# Patient Record
Sex: FEMALE | Race: WHITE | NOT HISPANIC OR LATINO | URBAN - METROPOLITAN AREA
[De-identification: names, ages, dates, MRNs, and addresses within clinical notes are randomized per-mention and may not be internally consistent; named-entity substitution may affect disease eponyms.]

---

## 2017-03-15 ENCOUNTER — OUTPATIENT (OUTPATIENT)
Dept: OUTPATIENT SERVICES | Facility: HOSPITAL | Age: 27
LOS: 1 days | Discharge: HOME | End: 2017-03-15

## 2017-03-15 ENCOUNTER — RX RENEWAL (OUTPATIENT)
Age: 27
End: 2017-03-15

## 2017-03-27 ENCOUNTER — OUTPATIENT (OUTPATIENT)
Dept: OUTPATIENT SERVICES | Facility: HOSPITAL | Age: 27
LOS: 1 days | Discharge: HOME | End: 2017-03-27

## 2017-06-27 DIAGNOSIS — K03.81 CRACKED TOOTH: ICD-10-CM

## 2017-08-01 ENCOUNTER — OUTPATIENT (OUTPATIENT)
Dept: OUTPATIENT SERVICES | Facility: HOSPITAL | Age: 27
LOS: 1 days | Discharge: HOME | End: 2017-08-01

## 2017-08-01 DIAGNOSIS — Z01.21 ENCOUNTER FOR DENTAL EXAMINATION AND CLEANING WITH ABNORMAL FINDINGS: ICD-10-CM

## 2017-08-09 ENCOUNTER — OUTPATIENT (OUTPATIENT)
Dept: OUTPATIENT SERVICES | Facility: HOSPITAL | Age: 27
LOS: 1 days | Discharge: HOME | End: 2017-08-09

## 2017-08-09 DIAGNOSIS — K02.52 DENTAL CARIES ON PIT AND FISSURE SURFACE PENETRATING INTO DENTIN: ICD-10-CM

## 2017-09-22 ENCOUNTER — RX RENEWAL (OUTPATIENT)
Age: 27
End: 2017-09-22

## 2018-02-02 DIAGNOSIS — K03.81 CRACKED TOOTH: ICD-10-CM

## 2018-03-28 ENCOUNTER — RX RENEWAL (OUTPATIENT)
Age: 28
End: 2018-03-28

## 2018-09-28 ENCOUNTER — RX RENEWAL (OUTPATIENT)
Age: 28
End: 2018-09-28

## 2018-12-25 ENCOUNTER — EMERGENCY (EMERGENCY)
Facility: HOSPITAL | Age: 28
LOS: 0 days | Discharge: HOME | End: 2018-12-25
Attending: EMERGENCY MEDICINE | Admitting: EMERGENCY MEDICINE

## 2018-12-25 VITALS
SYSTOLIC BLOOD PRESSURE: 138 MMHG | DIASTOLIC BLOOD PRESSURE: 72 MMHG | RESPIRATION RATE: 18 BRPM | TEMPERATURE: 99 F | OXYGEN SATURATION: 100 % | HEART RATE: 71 BPM

## 2018-12-25 VITALS — WEIGHT: 106.04 LBS
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
## 2018-12-25 DIAGNOSIS — S01.511A LACERATION WITHOUT FOREIGN BODY OF LIP, INITIAL ENCOUNTER: ICD-10-CM

## 2018-12-25 DIAGNOSIS — Z79.899 OTHER LONG TERM (CURRENT) DRUG THERAPY: ICD-10-CM
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
## 2018-12-25 DIAGNOSIS — Z91.013 ALLERGY TO SEAFOOD: ICD-10-CM

## 2018-12-25 DIAGNOSIS — Z87.19 PERSONAL HISTORY OF OTHER DISEASES OF THE DIGESTIVE SYSTEM: Chronic | ICD-10-CM
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
## 2018-12-25 DIAGNOSIS — Z98.890 OTHER SPECIFIED POSTPROCEDURAL STATES: ICD-10-CM
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
## 2018-12-25 RX ORDER — AMOXICILLIN 250 MG/5ML
1 SUSPENSION, RECONSTITUTED, ORAL (ML) ORAL
Qty: 21 | Refills: 0 | OUTPATIENT
Start: 2018-12-25 | End: 2018-12-31

## 2018-12-25 RX ORDER — NADOLOL 80 MG/1
0 TABLET ORAL
Qty: 0 | Refills: 0 | COMMUNITY

## 2018-12-25 RX ORDER — AMOXICILLIN 250 MG/5ML
500 SUSPENSION, RECONSTITUTED, ORAL (ML) ORAL ONCE
Qty: 0 | Refills: 0 | Status: COMPLETED | OUTPATIENT
Start: 2018-12-25 | End: 2018-12-25

## 2018-12-25 RX ADMIN — Medication 500 MILLIGRAM(S): at 18:13
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
## 2018-12-25 NOTE — ED PROVIDER NOTE - NSFOLLOWUPINSTRUCTIONS_ED_ALL_ED_FT
Laceration    A laceration is a cut that goes through all of the layers of the skin and into the tissue that is right under the skin. Some lacerations heal on their own. Others need to be closed with stitches (sutures), staples, skin adhesive strips, or skin glue. Proper laceration care minimizes the risk of infection and helps the laceration to heal better.     SEEK IMMEDIATE MEDICAL CARE IF YOU HAVE THE FOLLOWING SYMPTOMS: swelling around the wound, worsening pain, drainage from the wound, red streaking going away from your wound, inability to move finger or toe near the laceration, or discoloration of skin near the laceration.

## 2018-12-25 NOTE — ED ADULT NURSE NOTE - NSIMPLEMENTINTERV_GEN_ALL_ED
Implemented All Fall with Harm Risk Interventions:  [place] to call system. Call bell, personal items and telephone within reach. Instruct patient to call for assistance. Room bathroom lighting operational. Non-slip footwear when patient is off stretcher. Physically safe environment: no spills, clutter or unnecessary equipment. Stretcher in lowest position, wheels locked, appropriate side rails in place. Provide visual cue, wrist band, yellow gown, etc. Monitor gait and stability. Monitor for mental status changes and reorient to person, place, and time. Review medications for side effects contributing to fall risk. Reinforce activity limits and safety measures with patient and family. Provide visual clues: red socks.

## 2018-12-25 NOTE — ED PROVIDER NOTE - PROGRESS NOTE DETAILS
+FB noted to lower lip on xray. Wound explored under bloodless field, irrigated with normal saline. No FB noted. Per mom patient had prior laceration to lower lip involving dental fx requiring cap. ? retained FB from previous injury. Mom advised to look for signs of infection including, pus, drainage, fevers, chills, swelling and return to ED for any concerns. Abx prescribed

## 2018-12-25 NOTE — ED PROCEDURE NOTE - PROCEDURE ADDITIONAL DETAILS
2 lacs 0.5 cm each and 1 lac on lip 0.5cm. Wound explored under bloodless field. No FB noted. Total of 3 suture placed, 1 on each lac

## 2018-12-25 NOTE — ED PROVIDER NOTE - PHYSICAL EXAMINATION
Gen: Alert, NAD, well appearing  Head: NC, AT, PERRL, EOMI, normal lids/conjunctiva  ENT: normal hearing, patent oropharynx. +ridges noted teeth (chronic per mom, patient grinds teeth). No acute dental injury noted. +0.5cm lac to lower lip, through and through, 0.5cm lac x2 inside lower lip of mouth  Neck: +supple, no tenderness/meningismus,  Mskel: no edema/erythema/cyanosis  Skin: +contusion to chin. No rash, warm/dry. + 0.5cm  lac to lip and 2 lacs inside mouth 0.5cm  each  Neuro: Alert, at baseline per mom.

## 2018-12-25 NOTE — ED PROVIDER NOTE - NS ED ROS FT
Review of Systems    Constitutional: (-) fever  Eyes/ENT: (-) blurry vision, (-) epistaxis, (+) lac inside mouth  Gastrointestinal: (-) vomiting, (-) diarrhea  Musculoskeletal: (-) neck pain, (-) back pain, (-) joint pain  Integumentary: (-) rash, (-) edema, (+) lac to lip  Neurological: (-) headache, (-) altered mental status

## 2019-04-15 ENCOUNTER — MEDICATION RENEWAL (OUTPATIENT)
Age: 29
End: 2019-04-15

## 2019-07-01 ENCOUNTER — OUTPATIENT (OUTPATIENT)
Dept: OUTPATIENT SERVICES | Facility: HOSPITAL | Age: 29
LOS: 1 days | Discharge: HOME | End: 2019-07-01

## 2019-07-01 DIAGNOSIS — Z87.19 PERSONAL HISTORY OF OTHER DISEASES OF THE DIGESTIVE SYSTEM: Chronic | ICD-10-CM

## 2019-07-01 DIAGNOSIS — Z01.20 ENCOUNTER FOR DENTAL EXAMINATION AND CLEANING WITHOUT ABNORMAL FINDINGS: ICD-10-CM

## 2019-07-01 PROBLEM — F84.2 RETT'S SYNDROME: Chronic | Status: ACTIVE | Noted: 2018-12-25

## 2019-07-01 PROBLEM — I45.81 LONG QT SYNDROME: Chronic | Status: ACTIVE | Noted: 2018-12-25

## 2019-09-12 ENCOUNTER — EMERGENCY (EMERGENCY)
Facility: HOSPITAL | Age: 29
LOS: 0 days | Discharge: HOME | End: 2019-09-12
Attending: EMERGENCY MEDICINE | Admitting: EMERGENCY MEDICINE
Payer: COMMERCIAL

## 2019-09-12 VITALS
HEART RATE: 68 BPM | OXYGEN SATURATION: 98 % | DIASTOLIC BLOOD PRESSURE: 68 MMHG | TEMPERATURE: 97 F | RESPIRATION RATE: 20 BRPM | SYSTOLIC BLOOD PRESSURE: 101 MMHG

## 2019-09-12 DIAGNOSIS — S02.5XXA FRACTURE OF TOOTH (TRAUMATIC), INITIAL ENCOUNTER FOR CLOSED FRACTURE: ICD-10-CM

## 2019-09-12 DIAGNOSIS — S01.81XA LACERATION WITHOUT FOREIGN BODY OF OTHER PART OF HEAD, INITIAL ENCOUNTER: ICD-10-CM
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
## 2019-09-12 DIAGNOSIS — Z87.19 PERSONAL HISTORY OF OTHER DISEASES OF THE DIGESTIVE SYSTEM: Chronic | ICD-10-CM

## 2019-09-12 DIAGNOSIS — S01.511A LACERATION WITHOUT FOREIGN BODY OF LIP, INITIAL ENCOUNTER: ICD-10-CM

## 2019-09-12 DIAGNOSIS — Z91.013 ALLERGY TO SEAFOOD: ICD-10-CM
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
## 2019-09-12 DIAGNOSIS — S09.90XA UNSPECIFIED INJURY OF HEAD, INITIAL ENCOUNTER: ICD-10-CM
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
## 2019-09-12 PROCEDURE — 99284 EMERGENCY DEPT VISIT MOD MDM: CPT | Mod: 25

## 2019-09-12 PROCEDURE — 70450 CT HEAD/BRAIN W/O DYE: CPT | Mod: 26

## 2019-09-12 PROCEDURE — 12013 RPR F/E/E/N/L/M 2.6-5.0 CM: CPT

## 2019-09-12 PROCEDURE — 70486 CT MAXILLOFACIAL W/O DYE: CPT | Mod: 26

## 2019-09-12 NOTE — CONSULT NOTE ADULT - SUBJECTIVE AND OBJECTIVE BOX
Patient is a 28y old  Female who presents with a chief complaint of fell and cracked teeth #8 & #9    HPI: Patient fell on the chair and hit her face and cracked tooth #8 & #9      PAST MEDICAL & SURGICAL HISTORY:  Rett syndrome  Prolonged QT syndrome  H/O inguinal hernia    (  - ) heart valve replacement  ( -  ) joint replacement  ( -  ) pregnancy    MEDICATIONS  (STANDING):    MEDICATIONS  (PRN):      Allergies    No Known Drug Allergies  shellfish (Unknown)    Intolerances        FAMILY HISTORY:      *SOCIAL HISTORY: ( -  ) Tobacco; (  - ) ETOH    *Last Dental Visit:    Vital Signs Last 24 Hrs  T(C): 36.1 (12 Sep 2019 12:23), Max: 36.1 (12 Sep 2019 12:23)  T(F): 97 (12 Sep 2019 12:23), Max: 97 (12 Sep 2019 12:23)  HR: 68 (12 Sep 2019 12:23) (68 - 68)  BP: 101/68 (12 Sep 2019 12:23) (101/68 - 101/68)  BP(mean): --  RR: 20 (12 Sep 2019 12:23) (20 - 20)  SpO2: 98% (12 Sep 2019 12:23) (98% - 98%)    EOE:  TMJ ( -  ) clicks                     ( -  ) pops                     ( -  ) crepitus             Mandible <<FROM>>             Facial bones and MOM <<grossly intact>>             (  - ) trismus             ( -  ) lymphadenopathy             ( +  ) swelling             ( -  ) asymmetry             ( +  ) palpation             (  - ) dyspnea             ( -  ) dysphagia             ( -  ) loss of consciousness    IOE:  <<permanent>> dentition: <<grossly intact>> OR <<multiple carious teeth>> OR <<multiple missing teeth>>           hard/soft palate:  ( -  ) palatal torus, <<No pathology noted>>           tongue/FOM <<No pathology noted>>           labial/buccal mucosa <<No pathology noted>>           ( +  ) percussion           ( +  ) palpation           ( +  ) swelling            ( -  ) abscess           ( -  ) sinus tract    Dentition present: <<   >>  Mobility: << Class II mobility on tooth #8 and #9  >>  Caries: <<   >>         *DENTAL RADIOGRAPHS: Periapical X-ray taken. #8 & #9 cracked into dentin without pulp involved    RADIOLOGY & ADDITIONAL STUDIES:    *ASSESSMENT:    EOE: Lip laceration with stitches from the ED    IOE: tooth #8 & #9 cracked with out pulp exposed.      *PLAN:    Smooth out the sharp edges and build tooth #8 & 9 up with  and composite    Informed mother that the tooth #8 & 9 have questionable restorability. Mother understands    Next Visit: Re-evaluate the vitality for tooth #8 & 9 and restore #8 & #9 with composite.    PROCEDURE:   Verbal and written consent given.  Anesthesia: <<  none  >>   Treatment: << smooth out the sharp edges using white polish bur, apply L-pop, and build #8 up with composite. Polish #8 using polish bur. Occlusion checked. Tooth #9 covered with Vitre bond and will be re-evaluate again next visit  >>     RECOMMENDATIONS:  1) << soft diet, antibiotic, and pain control >>  2) Dental F/U for permanent filling on tooth #8 and #9  3) If any difficulty swallowing/breathing, fever occur, return to ER.     Next Visit: Re-evaluate the vitality for tooth #8 & 9 and restore #8 & #9 with composite.    Dr. Iogr Guillen DDS, pager # [phone]

## 2019-09-12 NOTE — ED PROVIDER NOTE - PATIENT PORTAL LINK FT
You can access the FollowMyHealth Patient Portal offered by [facility] by registering at the following website: [email or web address]. By joining [employer]’s FollowMyHealth portal, you will also be able to view your health information using other applications (apps) compatible with our system. You can access the FollowMyHealth Patient Portal offered by [facility] by registering at the following website: [email or web address]. By joining [employer]’s FollowMyHealth portal, you will also be able to view your health information using other applications (apps) compatible with our system.

## 2019-09-12 NOTE — ED PROVIDER NOTE - CLINICAL SUMMARY MEDICAL DECISION MAKING FREE TEXT BOX
28 female with known baseline cognitive issues here for head injury with laceration; had imaging wound management and plan is direct discharge to Dental clinic.

## 2019-09-12 NOTE — ED PROVIDER NOTE - PHYSICAL EXAMINATION
VITAL SIGNS: I have reviewed nursing notes and confirm.  CONSTITUTIONAL: Well-developed; well-nourished; in no acute distress.  SKIN: +2cm laceration to top of upper lip. +1.5cm laceration to inner upper lip. Remainder of skin exam is warm and dry, no acute rash.  HEAD: Normocephalic; atraumatic.  EYES: PERRL, EOM intact; conjunctiva and sclera clear.  ENT: No nasal discharge; airway clear. See above. No epistaxis.   NECK: Supple; non tender.  CARD: S1, S2 normal; no murmurs, gallops, or rubs. Regular rate and rhythm.  RESP: Clear to auscultation bilaterally. No wheezes, rales or rhonchi.  ABD: Normal bowel sounds; soft; non-distended; non-tender.   EXT: Normal ROM. No edema.  LYMPH: No acute cervical adenopathy.  NEURO: Alert, oriented. Grossly unremarkable. No focal deficits.  PSYCH: Cooperative, appropriate. VITAL SIGNS: I have reviewed nursing notes and confirm.  CONSTITUTIONAL: Well-developed; well-nourished; in no acute distress.  SKIN: +2cm laceration to top of upper lip. +1.5cm laceration to inner upper lip. +0.5 cm laceration above lateral aspect of upper lip. Remainder of skin exam is warm and dry, no acute rash.  HEAD: Normocephalic; atraumatic.  EYES: PERRL, EOM intact; conjunctiva and sclera clear.  ENT: No nasal discharge; airway clear. See above. No epistaxis.   NECK: Supple; non tender.  CARD: S1, S2 normal; no murmurs, gallops, or rubs. Regular rate and rhythm.  RESP: Clear to auscultation bilaterally. No wheezes, rales or rhonchi.  ABD: Normal bowel sounds; soft; non-distended; non-tender.   EXT: Normal ROM. No edema.  LYMPH: No acute cervical adenopathy.  NEURO: Alert, oriented. Grossly unremarkable. No focal deficits.  PSYCH: Cooperative, appropriate.

## 2019-09-12 NOTE — ED PROVIDER NOTE - NS ED ROS FT
Review of Systems:  	•	CONSTITUTIONAL - no fever, no diaphoresis, no chills  	•	SKIN - +laceration, no rash  	•	HEMATOLOGIC - + bleeding, no bruising  	•	EYES - no eye pain, no blurry vision  	•	ENT - +tooth fracture, no congestion  	•	RESPIRATORY - no shortness of breath, no cough  	•	CARDIAC - no chest pain, no palpitations  	•	GI - no abd pain, no nausea, no vomiting, no diarrhea, no constipation  	•	GENITO-URINARY - no dysuria; no hematuria, no increased urinary frequency  	•	MUSCULOSKELETAL - no joint paint, no swelling, no redness  	•	NEUROLOGIC - +head injury, no weakness, no headache, no paresthesias, no LOC  	All other ROS are negative except as documented in HPI.
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
## 2019-09-12 NOTE — ED PROVIDER NOTE - CARE PLAN
Principal Discharge DX:	Lip laceration  Secondary Diagnosis:	Head injury Principal Discharge DX:	Lip laceration  Secondary Diagnosis:	Head injury  Secondary Diagnosis:	Facial laceration  Secondary Diagnosis:	Fall  Secondary Diagnosis:	Tooth fractures
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
## 2019-09-12 NOTE — ED ADULT NURSE NOTE - OBJECTIVE STATEMENT
pt fell PTA, GCS=15. SNP, nonverbal. baseline mental status per mother. laceration to upper lip. mother denies LOC. no vomiting per mother

## 2019-09-12 NOTE — ED PROVIDER NOTE - NSFOLLOWUPINSTRUCTIONS_ED_ALL_ED_FT
Laceration    A laceration is a cut that goes through all of the layers of the skin and into the tissue that is right under the skin. Some lacerations heal on their own. Others need to be closed with stitches (sutures), staples, skin adhesive strips, or skin glue. Proper laceration care minimizes the risk of infection and helps the laceration to heal better.     SEEK IMMEDIATE MEDICAL CARE IF YOU HAVE THE FOLLOWING SYMPTOMS: swelling around the wound, worsening pain, drainage from the wound, red streaking going away from your wound, inability to move finger or toe near the laceration, or discoloration of skin near the laceration.     Closed Head Injury    Closed head injury in an injury to your head that may or may not involve a traumatic brain injury (TBI). Symptoms of TBI can be short or long lasting and include headache, dizziness, interference with memory or speech, fatigue, confusion, changes in sleep, mood changes, nausea, depression/anxiety, and dulling of senses. Make sure to obtain proper rest which includes getting plenty of sleep, avoiding excessive visual stimulation, and avoiding activities that may cause physical or mental stress. Avoid any situation where there is potential for another head injury including sports.    SEEK MEDICAL CARE IF YOU HAVE THE FOLLOWING SYMPTOMS: unusual drowsiness, vomiting, severe dizziness, seizures, lightheadedness, muscular weakness, different pupil sizes, visual changes, or clear or bloody discharge from your ears or nose. Return in ED or PMD in 5 days for suture removal.     Laceration    A laceration is a cut that goes through all of the layers of the skin and into the tissue that is right under the skin. Some lacerations heal on their own. Others need to be closed with stitches (sutures), staples, skin adhesive strips, or skin glue. Proper laceration care minimizes the risk of infection and helps the laceration to heal better.     SEEK IMMEDIATE MEDICAL CARE IF YOU HAVE THE FOLLOWING SYMPTOMS: swelling around the wound, worsening pain, drainage from the wound, red streaking going away from your wound, inability to move finger or toe near the laceration, or discoloration of skin near the laceration.     Closed Head Injury    Closed head injury in an injury to your head that may or may not involve a traumatic brain injury (TBI). Symptoms of TBI can be short or long lasting and include headache, dizziness, interference with memory or speech, fatigue, confusion, changes in sleep, mood changes, nausea, depression/anxiety, and dulling of senses. Make sure to obtain proper rest which includes getting plenty of sleep, avoiding excessive visual stimulation, and avoiding activities that may cause physical or mental stress. Avoid any situation where there is potential for another head injury including sports.    SEEK MEDICAL CARE IF YOU HAVE THE FOLLOWING SYMPTOMS: unusual drowsiness, vomiting, severe dizziness, seizures, lightheadedness, muscular weakness, different pupil sizes, visual changes, or clear or bloody discharge from your ears or nose. Return in ED or PMD in 5 days for suture removal.     Laceration    A laceration is a cut that goes through all of the layers of the skin and into the tissue that is right under the skin. Some lacerations heal on their own. Others need to be closed with stitches (sutures), staples, skin adhesive strips, or skin glue. Proper laceration care minimizes the risk of infection and helps the laceration to heal better.     SEEK IMMEDIATE MEDICAL CARE IF YOU HAVE THE FOLLOWING SYMPTOMS: swelling around the wound, worsening pain, drainage from the wound, red streaking going away from your wound, inability to move finger or toe near the laceration, or discoloration of skin near the laceration.     Closed Head Injury    Closed head injury in an injury to your head that may or may not involve a traumatic brain injury (TBI). Symptoms of TBI can be short or long lasting and include headache, dizziness, interference with memory or speech, fatigue, confusion, changes in sleep, mood changes, nausea, depression/anxiety, and dulling of senses. Make sure to obtain proper rest which includes getting plenty of sleep, avoiding excessive visual stimulation, and avoiding activities that may cause physical or mental stress. Avoid any situation where there is potential for another head injury including sports.    SEEK MEDICAL CARE IF YOU HAVE THE FOLLOWING SYMPTOMS: unusual drowsiness, vomiting, severe dizziness, seizures, lightheadedness, muscular weakness, different pupil sizes, visual changes, or clear or bloody discharge from your ears or nose.    Facial Laceration  A facial laceration is a cut (laceration) on the face. You can get a facial laceration from any accident or injury that cuts or tears the skin or tissues on your face. Facial lacerations can bleed and be painful. You may need medical attention to stop the bleeding, help the wound heal, lower your risk for infection, and prevent scarring. Lacerations usually heal quickly after treatment.    What are the causes?  Facial lacerations are often caused by:  A motor vehicle accident.  A sports injury.  A violent attack.  A fall.  What are the signs or symptoms?  Common symptoms of this condition include:  An obvious cut on the face.  Bleeding.  Pain.  Swelling.  Bruising.  A change in the appearance of the face (deformity).  How is this diagnosed?  Your health care provider can diagnose a facial laceration by doing a physical exam and asking how the injury happened. Your provider will also check for areas of bleeding, tissue damage, nerve injury, and a foreign body in your wound.    How is this treated?  Treatment for a facial laceration depends on how severe and deep the wound is. It also depends on the risk for infection. First, your health care provider will clean the wound to prevent infection. Then, your health care provider will decide whether to close the wound. This depends on how deep the laceration is and how long ago your injury happened. If there is an increased risk of infection, the wound will not be closed.  If your wound needs to be closed:  Your health care provider will use stitches (sutures), skin glue (skin adhesive), or skin adhesive strips to repair the laceration.  Your health care provider may first numb the area around your wound by injecting a numbing medicine (local anesthetic) in and around your laceration before doing the sutures.  Torn skin edges or dead skin may be removed.  If sutures are used, the laceration may be closed in layers. Absorbable sutures will be used for deep tissues and muscle. Removable sutures will be used to close the skin.  You may be given:  Pain medicine.  A tetanus shot.  Oral antibiotic medicines.  Antibiotic ointment.  Follow these instructions at home:  Wound care     Follow your health care provider’s instructions for wound care. These instructions will vary depending on how the wound was closed.    For sutures:   Keep the wound clean and dry.  If you were given a bandage (dressing), change it at least once a day, or as told by your health care provider. Also change the dressing if it gets wet or dirty.  Wash the wound with soap and water two times a day, or as told by your health care provider. Rinse off the soap with water. Pat the wound dry with a clean towel.  After cleaning, apply a thin layer of antibiotic ointment as told by your health care provider. This helps prevent infection and keeps the dressing from sticking to the wound.  You may shower as usual after the first 24 hours. Do not soak the wound until the sutures are removed.  Return to have you sutures removed as told by your health care provider.  Do not wear makeup until your health care provider has approved.  For skin adhesive:   You may briefly wet your wound in the shower or bath.  Do not soak or scrub the wound.  Do not swim.  Do not sweat heavily until the skin adhesive has fallen off on its own.  After showering or bathing, gently pat the wound dry with a clean towel.  Do not apply liquid medicine, cream medicine, ointment, or makeup to your wound while the skin adhesive is in place. This may loosen the film before your wound is healed.  If you have a dressing over your wound, be careful not to apply tape directly over the skin adhesive. This may pull off the adhesive before the wound is healed.  Do not spend a long time in the sun or use a tanning lamp while the skin adhesive is in place.  The skin adhesive will usually remain in place for 5–10 days and then naturally fall off the skin. Do not pick at the adhesive film.  For skin adhesive strips:   Keep the wound clean and dry.  Do not let the skin adhesive strips get wet.  Bathe carefully to keep the wound and adhesive strips dry. If the wound gets wet, pat it dry with a clean towel right away.  Skin adhesive strips fall off on their own over time. You may trim the strips as the wound heals. Do not remove skin adhesive strips that are still stuck to the wound.  General instructions     Image   Check your wound area every day for signs of infection. Check for:  Redness, swelling, or pain.  Fluid or blood.  Warmth.  Pus or a bad smell.  Take over-the-counter and prescription medicines only as told by your health care provider.  If you were prescribed an antibiotic, take or apply it as told by your health care provider. Do not stop using the antibiotic even if your condition improves.  After the laceration has healed:  Know that it can take a year or two for redness or scarring to fade.  Apply sunscreen to the skin of your healed wound to minimize scarring. Ultraviolet (UV) rays can darken scar tissue.  Contact a health care provider if:  You have a fever.  You have redness, swelling, or pain around your wound.  You have fluid or blood coming from your wound.  Your wound feels warm to the touch.  You have pus or a bad smell coming from your wound.  Get help right away if:  You have a red streak going away from your wound.  Summary  You may need treatment for a facial laceration to prevent infection, stop bleeding, help healing, and prevent scarring.  A deep laceration may be closed with stitches (sutures).  Follow your health care provider's wound care instructions carefully.  This information is not intended to replace advice given to you by your health care provider. Make sure you discuss any questions you have with your health care provider. Return in ED or PMD in 5 days for suture removal.     Laceration    A laceration is a cut that goes through all of the layers of the skin and into the tissue that is right under the skin. Some lacerations heal on their own. Others need to be closed with stitches (sutures), staples, skin adhesive strips, or skin glue. Proper laceration care minimizes the risk of infection and helps the laceration to heal better.     SEEK IMMEDIATE MEDICAL CARE IF YOU HAVE THE FOLLOWING SYMPTOMS: swelling around the wound, worsening pain, drainage from the wound, red streaking going away from your wound, inability to move finger or toe near the laceration, or discoloration of skin near the laceration.     Closed Head Injury    Closed head injury in an injury to your head that may or may not involve a traumatic brain injury (TBI). Symptoms of TBI can be short or long lasting and include headache, dizziness, interference with memory or speech, fatigue, confusion, changes in sleep, mood changes, nausea, depression/anxiety, and dulling of senses. Make sure to obtain proper rest which includes getting plenty of sleep, avoiding excessive visual stimulation, and avoiding activities that may cause physical or mental stress. Avoid any situation where there is potential for another head injury including sports.    SEEK MEDICAL CARE IF YOU HAVE THE FOLLOWING SYMPTOMS: unusual drowsiness, vomiting, severe dizziness, seizures, lightheadedness, muscular weakness, different pupil sizes, visual changes, or clear or bloody discharge from your ears or nose.    Facial Laceration  A facial laceration is a cut (laceration) on the face. You can get a facial laceration from any accident or injury that cuts or tears the skin or tissues on your face. Facial lacerations can bleed and be painful. You may need medical attention to stop the bleeding, help the wound heal, lower your risk for infection, and prevent scarring. Lacerations usually heal quickly after treatment.    What are the causes?  Facial lacerations are often caused by:  A motor vehicle accident.  A sports injury.  A violent attack.  A fall.  What are the signs or symptoms?  Common symptoms of this condition include:  An obvious cut on the face.  Bleeding.  Pain.  Swelling.  Bruising.  A change in the appearance of the face (deformity).  How is this diagnosed?  Your health care provider can diagnose a facial laceration by doing a physical exam and asking how the injury happened. Your provider will also check for areas of bleeding, tissue damage, nerve injury, and a foreign body in your wound.    How is this treated?  Treatment for a facial laceration depends on how severe and deep the wound is. It also depends on the risk for infection. First, your health care provider will clean the wound to prevent infection. Then, your health care provider will decide whether to close the wound. This depends on how deep the laceration is and how long ago your injury happened. If there is an increased risk of infection, the wound will not be closed.  If your wound needs to be closed:  Your health care provider will use stitches (sutures), skin glue (skin adhesive), or skin adhesive strips to repair the laceration.  Your health care provider may first numb the area around your wound by injecting a numbing medicine (local anesthetic) in and around your laceration before doing the sutures.  Torn skin edges or dead skin may be removed.  If sutures are used, the laceration may be closed in layers. Absorbable sutures will be used for deep tissues and muscle. Removable sutures will be used to close the skin.  You may be given:  Pain medicine.  A tetanus shot.  Oral antibiotic medicines.  Antibiotic ointment.  Follow these instructions at home:  Wound care     Follow your health care provider’s instructions for wound care. These instructions will vary depending on how the wound was closed.    For sutures:   Keep the wound clean and dry.  If you were given a bandage (dressing), change it at least once a day, or as told by your health care provider. Also change the dressing if it gets wet or dirty.  Wash the wound with soap and water two times a day, or as told by your health care provider. Rinse off the soap with water. Pat the wound dry with a clean towel.  After cleaning, apply a thin layer of antibiotic ointment as told by your health care provider. This helps prevent infection and keeps the dressing from sticking to the wound.  You may shower as usual after the first 24 hours. Do not soak the wound until the sutures are removed.  Return to have you sutures removed as told by your health care provider.  Do not wear makeup until your health care provider has approved.  For skin adhesive:   You may briefly wet your wound in the shower or bath.  Do not soak or scrub the wound.  Do not swim.  Do not sweat heavily until the skin adhesive has fallen off on its own.  After showering or bathing, gently pat the wound dry with a clean towel.  Do not apply liquid medicine, cream medicine, ointment, or makeup to your wound while the skin adhesive is in place. This may loosen the film before your wound is healed.  If you have a dressing over your wound, be careful not to apply tape directly over the skin adhesive. This may pull off the adhesive before the wound is healed.  Do not spend a long time in the sun or use a tanning lamp while the skin adhesive is in place.  The skin adhesive will usually remain in place for 5–10 days and then naturally fall off the skin. Do not pick at the adhesive film.  For skin adhesive strips:   Keep the wound clean and dry.  Do not let the skin adhesive strips get wet.  Bathe carefully to keep the wound and adhesive strips dry. If the wound gets wet, pat it dry with a clean towel right away.  Skin adhesive strips fall off on their own over time. You may trim the strips as the wound heals. Do not remove skin adhesive strips that are still stuck to the wound.  General instructions     Image   Check your wound area every day for signs of infection. Check for:  Redness, swelling, or pain.  Fluid or blood.  Warmth.  Pus or a bad smell.  Take over-the-counter and prescription medicines only as told by your health care provider.  If you were prescribed an antibiotic, take or apply it as told by your health care provider. Do not stop using the antibiotic even if your condition improves.  After the laceration has healed:  Know that it can take a year or two for redness or scarring to fade.  Apply sunscreen to the skin of your healed wound to minimize scarring. Ultraviolet (UV) rays can darken scar tissue.  Contact a health care provider if:  You have a fever.  You have redness, swelling, or pain around your wound.  You have fluid or blood coming from your wound.  Your wound feels warm to the touch.  You have pus or a bad smell coming from your wound.  Get help right away if:  You have a red streak going away from your wound.  Summary  You may need treatment for a facial laceration to prevent infection, stop bleeding, help healing, and prevent scarring.  A deep laceration may be closed with stitches (sutures).  Follow your health care provider's wound care instructions carefully.  This information is not intended to replace advice given to you by your health care provider. Make sure you discuss any questions you have with your health care provider.    Tooth Injuries  Tooth injuries (tooth trauma) include cracked or broken teeth (fractures), teeth that have been moved out of place or dislodged (luxations), and knocked-out teeth (avulsions).    A tooth injury often needs to be treated quickly to save the tooth. However, sometimes it is not possible to save a tooth after an injury, and the tooth may need to be removed (extracted).    What are the causes?  Tooth injuries may be caused by any force that is strong enough to chip, break, dislodge, or knock out a tooth. Forces may come from:  Sports injuries.  Falls.  Accidents.  Fights.  What increases the risk?  You are more likely to injure a tooth if you play contact sports, such as football or boxing, without using a mouth guard. You are also more likely to injure a tooth if you participate in high-risk activities.    What are the signs or symptoms?  Symptoms of this condition include a tooth that is forced into the gum. This tooth may appear dislodged or moved out of position and into the tooth socket. A fractured tooth may not be so visible. Other symptoms of a tooth injury include:  Pain, especially when chewing.  A loose tooth.  Bleeding in or around the tooth.  Swelling or bruising near the tooth.  Swelling or bruising of the lip over the injured tooth.  Increased tooth sensitivity to heat and cold.  A tooth that is knocked out of its place in the gum (socket).  How is this diagnosed?  A tooth injury can be diagnosed with a medical history and a physical exam. You may also need dental X-rays to check for injuries to the root of the tooth.    How is this treated?  Treatment depends on the type of injury that you have and how bad it is. Treatment may need to be done quickly to save your tooth. Possible treatments include:  Replacing a tooth fragment with a filling, a cap, or a hard, protective cover (crown). This may be an option for a chip or fracture that does not affect the inside of your tooth (pulp).  Repairing the inside of the tooth (root canal) and then placing a crown on top. This may be done to treat a tooth fracture that affects the pulp.  Repositioning a dislodged tooth, then doing a root canal. The root canal usually needs to be done within a few days of the injury. After the procedure, you may need to have a brace or splint to hold the tooth in place.  Replacing a knocked-out tooth in the socket, if possible, and then doing a root canal a few weeks later.  Extracting a tooth for a fracture that extends below the gum line or that splits the tooth completely.  Taking medicine, including:  Pain medicine.  Antibiotic medicine to help prevent infection.  Follow these instructions at home:  Image   Medicines     Take over-the-counter and prescription medicines only as told by your health care provider.  If you were prescribed an antibiotic medicine, take it as told by your health care provider. Do not stop taking the antibiotic even if you start to feel better.  Do not drive or use heavy machinery while taking prescription pain medicine.  General instructions     Do not eat or chew on very hard objects. These include ice cubes, pens, pencils, hard candy, and popcorn kernels.  Do not use any products that contain nicotine or tobacco, such as cigarettes and e-cigarettes. These may delay healing. If you need help quitting, ask your health care provider.  Do not clench or grind your teeth. Tell your health care provider if you grind your teeth while you sleep.  Your health care provider may recommend that you eat certain foods. This may include eating only soft foods.  Check the injured area every day for signs of infection. Watch for:  Redness, swelling, or pain.  Fluid, blood, or pus.  If directed, apply ice to your mouth near the injured tooth:  Put ice in a plastic bag.   Place a towel between your skin and the bag.   Leave the ice on for 20 minutes, 2–3 times a day.  Gargle with a salt-water mixture 3–4 times a day or as needed. To make a salt-water mixture, completely dissolve ½–1 tsp of salt in 1 cup of warm water.  Brush your teeth gently as directed by your health care provider.  Do not use your teeth to open packages.  Always wear mouth protection when you play contact sports.  Keep all follow-up visits as told by your health care provider. This is important.  Contact a health care provider if:  Your pain gets much worse, even after you take pain medicine.  You have pus coming from the site of the tooth injury.  You develop swelling near your injured tooth.  Your tooth splint—if you have one—becomes loose.  Your tooth becomes loose.  Get help right away if:  You develop facial swelling.  You have a fever.  You have bleeding near the tooth that does not stop in 10 minutes.  You have trouble swallowing.  You have trouble opening your mouth.  Your permanent tooth comes out after it is repositioned.  Summary  Tooth injuries include cracked or broken teeth and teeth that have been dislodged or moved out.  A tooth injury can be diagnosed with a medical history and a physical exam. You may also need dental X-rays to check for injuries to the root of the tooth.  Treatment depends on the type of injury you have and how bad it is. Treatment may need to be done quickly to save your tooth.  This information is not intended to replace advice given to you by your health care provider. Make sure you discuss any questions you have with your health care provider.

## 2019-09-13 NOTE — ED POST DISCHARGE NOTE - REASON FOR FOLLOW-UP
Other MOTHER, JANAY REQUESTING A NOTE FOR PATIENT TO RETURN TO PROGRAM. ALSO, REQUESTING THAT ALL DIAGNOSIS BE INCLUDED ON NOTER. AS PER MEDICAL RECORD PATIENT SUSTAINED: HEAD INJURY, LIP LACERATION REQUIRING 15 SUTURES TO CLOSE, AND CRACKED FRONT TEETH #8 AND #9, AS PER DENTAL NOTE.

## 2019-09-14 NOTE — ED PROCEDURE NOTE - CPROC ED POST PROC CARE GUIDE1
Verbal/written post procedure instructions were given to patient/caregiver./Keep the cast/splint/dressing clean and dry./Instructed patient/caregiver regarding signs and symptoms of infection./Instructed patient/caregiver to follow-up with primary care physician.
Verbal/written post procedure instructions were given to patient/caregiver./Instructed patient/caregiver regarding signs and symptoms of infection./Keep the cast/splint/dressing clean and dry./Instructed patient/caregiver to follow-up with primary care physician.
Verbal/written post procedure instructions were given to patient/caregiver./Instructed patient/caregiver regarding signs and symptoms of infection./Keep the cast/splint/dressing clean and dry./Instructed patient/caregiver to follow-up with primary care physician.

## 2019-09-14 NOTE — ED PROCEDURE NOTE - CPROC ED LACER REPAIR DETAIL1
The wound was explored to base in bloodless field./No foreign body
The wound was explored to base in bloodless field.
The wound was explored to base in bloodless field.

## 2020-03-11 ENCOUNTER — EMERGENCY (EMERGENCY)
Facility: HOSPITAL | Age: 30
LOS: 0 days | Discharge: HOME | End: 2020-03-11
Attending: EMERGENCY MEDICINE | Admitting: EMERGENCY MEDICINE
Payer: COMMERCIAL

## 2020-03-11 VITALS
RESPIRATION RATE: 18 BRPM | SYSTOLIC BLOOD PRESSURE: 110 MMHG | OXYGEN SATURATION: 95 % | TEMPERATURE: 99 F | HEART RATE: 50 BPM | DIASTOLIC BLOOD PRESSURE: 60 MMHG

## 2020-03-11 DIAGNOSIS — F84.2 RETT'S SYNDROME: ICD-10-CM

## 2020-03-11 DIAGNOSIS — Z87.19 PERSONAL HISTORY OF OTHER DISEASES OF THE DIGESTIVE SYSTEM: Chronic | ICD-10-CM

## 2020-03-11 DIAGNOSIS — F03.90 UNSPECIFIED DEMENTIA WITHOUT BEHAVIORAL DISTURBANCE: ICD-10-CM

## 2020-03-11 DIAGNOSIS — Z91.030 BEE ALLERGY STATUS: ICD-10-CM

## 2020-03-11 LAB
BASOPHILS # BLD AUTO: 0.03 K/UL — SIGNIFICANT CHANGE UP (ref 0–0.2)
BASOPHILS NFR BLD AUTO: 0.4 % — SIGNIFICANT CHANGE UP (ref 0–1)
EOSINOPHIL # BLD AUTO: 0.23 K/UL — SIGNIFICANT CHANGE UP (ref 0–0.7)
EOSINOPHIL NFR BLD AUTO: 2.8 % — SIGNIFICANT CHANGE UP (ref 0–8)
HCT VFR BLD CALC: 38.3 % — SIGNIFICANT CHANGE UP (ref 37–47)
HGB BLD-MCNC: 12.8 G/DL — SIGNIFICANT CHANGE UP (ref 12–16)
IMM GRANULOCYTES NFR BLD AUTO: 0.2 % — SIGNIFICANT CHANGE UP (ref 0.1–0.3)
LYMPHOCYTES # BLD AUTO: 2.26 K/UL — SIGNIFICANT CHANGE UP (ref 1.2–3.4)
LYMPHOCYTES # BLD AUTO: 27.9 % — SIGNIFICANT CHANGE UP (ref 20.5–51.1)
MCHC RBC-ENTMCNC: 30.2 PG — SIGNIFICANT CHANGE UP (ref 27–31)
MCHC RBC-ENTMCNC: 33.4 G/DL — SIGNIFICANT CHANGE UP (ref 32–37)
MCV RBC AUTO: 90.3 FL — SIGNIFICANT CHANGE UP (ref 81–99)
MONOCYTES # BLD AUTO: 0.99 K/UL — HIGH (ref 0.1–0.6)
MONOCYTES NFR BLD AUTO: 12.2 % — HIGH (ref 1.7–9.3)
NEUTROPHILS # BLD AUTO: 4.57 K/UL — SIGNIFICANT CHANGE UP (ref 1.4–6.5)
NEUTROPHILS NFR BLD AUTO: 56.5 % — SIGNIFICANT CHANGE UP (ref 42.2–75.2)
NRBC # BLD: 0 /100 WBCS — SIGNIFICANT CHANGE UP (ref 0–0)
PLATELET # BLD AUTO: 164 K/UL — SIGNIFICANT CHANGE UP (ref 130–400)
RBC # BLD: 4.24 M/UL — SIGNIFICANT CHANGE UP (ref 4.2–5.4)
RBC # FLD: 15.9 % — HIGH (ref 11.5–14.5)
WBC # BLD: 8.1 K/UL — SIGNIFICANT CHANGE UP (ref 4.8–10.8)
WBC # FLD AUTO: 8.1 K/UL — SIGNIFICANT CHANGE UP (ref 4.8–10.8)

## 2020-03-11 PROCEDURE — 93010 ELECTROCARDIOGRAM REPORT: CPT

## 2020-03-11 PROCEDURE — 74176 CT ABD & PELVIS W/O CONTRAST: CPT | Mod: 26

## 2020-03-11 PROCEDURE — 99285 EMERGENCY DEPT VISIT HI MDM: CPT

## 2020-03-11 PROCEDURE — 71250 CT THORAX DX C-: CPT | Mod: 26

## 2020-03-11 NOTE — ED PROVIDER NOTE - CLINICAL SUMMARY MEDICAL DECISION MAKING FREE TEXT BOX
cbc ok, prior team unable to obtain cmp and family refusing further blood draws.  ct chest/abd with no acute findings.  family strongly requesting to take pt home without any further testing or intervention, they feel that pt in no distress, is at baseline now and they prefer to f/u with neuro as outpt.  family given copy of lab work and imaging reports, they will return to ER if pt appears worse or for any other new/concerning symptoms.

## 2020-03-11 NOTE — ED PROVIDER NOTE - PATIENT PORTAL LINK FT
You can access the FollowMyHealth Patient Portal offered by [facility] by registering at the following website: [email or web address]. By joining [facility]’s FollowMyHealth portal, you will also be able to view your health information using other applications (apps) compatible with our system.

## 2020-03-11 NOTE — ED ADULT NURSE NOTE - NSIMPLEMENTINTERV_GEN_ALL_ED
Implemented All Universal Safety Interventions:  [place] to call system. Call bell, personal items and telephone within reach. Instruct patient to call for assistance. Room bathroom lighting operational. Non-slip footwear when patient is off stretcher. Physically safe environment: no spills, clutter or unnecessary equipment. Stretcher in lowest position, wheels locked, appropriate side rails in place.

## 2020-03-11 NOTE — ED PROVIDER NOTE - NSFOLLOWUPCLINICS_GEN_ALL_ED_FT
Neurosurgery at [facility]  Neurosurgery  [address] [place] [address], [address]  [place] [address]  Phone: [phone]  Fax:   Follow Up Time:

## 2020-03-11 NOTE — ED PROVIDER NOTE - PROGRESS NOTE DETAILS
pt's family assures that she is not pregnant and since cannot give a urine sample will forego U-preg for CT, CT tech aware. Signed off care to Dr. JAZMIN Malave who will f/u CT. ct scnas show no acute findings, family updated with results, family given strict return precautions, but decided rather f/u as outpatient with Dr. Vega for remiander of lab work/urine

## 2020-03-11 NOTE — ED PROVIDER NOTE - PHYSICAL EXAMINATION
VITAL SIGNS: I have reviewed nursing notes and confirm.  CONSTITUTIONAL: Well-developed; well-nourished; in no acute distress.   SKIN:  skin exam is warm and dry, no acute rash.    HEAD: Normocephalic; atraumatic.  EYES: ; conjunctiva and sclera clear.  ENT: No nasal discharge; airway clear.  NECK: Supple; non tender.  CARD: S1, S2 normal; no murmurs, gallops, or rubs. Regular rate and rhythm.   RESP: No wheezes, rales or rhonchi.  ABD: Normal bowel sounds; soft; non-distended; non-tender  EXT: Normal ROM.  No clubbing, cyanosis or edema. no midline spinal tenderness.  LYMPH: No acute cervical adenopathy.  NEURO: Alert, ambulating well, steady gait, moving all extremities   PSYCH: Cooperative, appropriate.

## 2020-03-11 NOTE — ED PROVIDER NOTE - CARE PROVIDER_API CALL
Cali Soria)  Internal Medicine  [address] [facility] [facility], [address]  [place] [address]  Phone: [phone]  Fax: [phone]  Follow Up Time: 1-3 Days    Ezra Edmond)  Cardiovascular Disease; Interventional Cardiology  [address] [place] [address] [address]  [place] [address]  Phone: [phone]  Fax: [phone]  Follow Up Time:

## 2020-03-11 NOTE — ED PROVIDER NOTE - OBJECTIVE STATEMENT
Pt is a 28y/o female with a pmhx of Rett Syndrome, Prolonged QT syndrome presents today with parents for eval of back riginidty x 2 days noticed by parents. Pt's parents mention that she is normally rigid in her extremities. Pt's parents mention that she has otherwise been acting at her baseline

## 2020-03-11 NOTE — ED PROVIDER NOTE - ATTENDING CONTRIBUTION TO CARE
29yoF with h/o Rett syndrome presents with rigidity. Per parents her back has appeared to be more rigid since yesterday. Normally leans forward to sleep and crosses her arms over each other at baseline and has regular OT however per her neurologist this back rigidity is less typical for Rett. Pt has baseline constipation, unsure last BM but no change with suppository given yesterday. Home denies fall/trauma and family unaware of any. Deny fever, vomiting, appearance of pain (normally screams if is in pain or too constipated and has not been doing this). Has had normal appetite, energy and activity level, behavior and mental status. D/w her neurologist who requests general medical evaluation, no specific recs. 29yoF with h/o Rett syndrome presents with rigidity. Per parents her back has appeared to be more rigid since yesterday. Normally leans forward to sleep and crosses her arms over each other at baseline and has regular OT however per her neurologist this back rigidity is less typical for Rett. Pt has baseline constipation, unsure last BM but no change with suppository given yesterday. Home denies fall/trauma and family unaware of any. Deny fever, vomiting, appearance of pain (normally screams if is in pain or too constipated and has not been doing this). Has had normal appetite, energy and activity level, behavior and mental status. D/w her neurologist who requests general medical evaluation, no specific recs. On exam, afebrile, hemodynamically stable, saturating well, NAD, well appearing, head NCAT, PERRL, EOMI grossly, anicteric, MMM, TM's cerumen impacted b/l, RRR, nml S1/S2, no m/r/g, lungs CTAB, no w/r/r, abd soft, NT, ND, nml BS, no rebound or guarding, AAO, CN's 3-12 grossly intact, extremities contracted but JAIME spontaneously at baseline, no leg cyanosis or edema, skin warm, well perfused, no rashes or hives. Character low suspicion for meningitis and no fever or infectious s/s's or leukocytosis. Abdomen benign with low suspicion for acute process. Pt well hydrated and drinking well with low suspicion for acute electrolyte abnormality and difficulty obtaining blood with multiple sticks including US guided and will desist from further attempts. No e/o trauma and CT _______. Character of higher suspicion for being due to her underlying pathology +/- medication related and will f/u with her neurologist. 29yoF with h/o Rett syndrome presents with rigidity. Per parents her back has appeared to be more rigid since yesterday. Normally leans forward to sleep and crosses her arms over each other at baseline and has regular OT however per her neurologist this back rigidity is less typical for Rett. Pt has baseline constipation, unsure last BM but no change with suppository given yesterday. Home denies fall/trauma and family unaware of any. Deny fever, vomiting, appearance of pain (normally screams if is in pain or too constipated and has not been doing this). Has had normal appetite, energy and activity level, behavior and mental status. D/w her neurologist who requests general medical evaluation, no specific recs. On exam, afebrile, hemodynamically stable, saturating well, NAD, well appearing, head NCAT, PERRL, EOMI grossly, anicteric, MMM, TM's cerumen impacted b/l, RRR, nml S1/S2, no m/r/g, lungs CTAB, no w/r/r, abd soft, NT, ND, nml BS, no rebound or guarding, AAO, CN's 3-12 grossly intact, extremities contracted but JAIME spontaneously at baseline, no leg cyanosis or edema, skin warm, well perfused, no rashes or hives. Character low suspicion for meningitis and no fever or infectious s/s's or leukocytosis. Abdomen benign with low suspicion for acute process. Pt well hydrated and drinking well with low suspicion for acute electrolyte abnormality and difficulty obtaining blood with multiple sticks including US guided and will desist from further attempts. No e/o trauma, CT pending. Character of higher suspicion for being due to her underlying pathology +/- medication related and will f/u with her neurologist. Signed off care to Dr. JAZMIN Malave who will f/u CT. 29yoF with h/o Rett syndrome presents with rigidity. Per parents her back has appeared to be more rigid since yesterday. Normally leans forward to sleep and crosses her arms over each other at baseline and has regular OT however per her neurologist this back rigidity is less typical for Rett. Pt has baseline constipation, unsure last BM but no change with suppository given yesterday. Home denies fall/trauma and family unaware of any. Deny fever, vomiting, appearance of pain (normally screams if is in pain or too constipated and has not been doing this). Has had normal appetite, energy and activity level, behavior and mental status. D/w her neurologist who requests general medical evaluation, no specific recs. On exam, afebrile, hemodynamically stable, saturating well, NAD, well appearing, head NCAT, PERRL, EOMI grossly, anicteric, MMM, no trismus, TM's cerumen impacted b/l, RRR, nml S1/S2, no m/r/g, lungs CTAB, no w/r/r, abd soft, NT, ND, nml BS, no rebound or guarding, AAO, CN's 3-12 grossly intact, UE's extremities contracted but JAIME spontaneously at baseline, no leg cyanosis or edema, skin warm, well perfused, no rashes or hives. Character low suspicion for meningitis and no fever or infectious s/s's or leukocytosis. Abdomen benign with low suspicion for acute process. Pt well hydrated and drinking well with low suspicion for acute electrolyte abnormality and difficulty obtaining blood with multiple sticks including US guided and will desist from further attempts. Character/appearance low suspicion for serotonin syndrome or NMS. No e/o trauma, CT pending. Character of higher suspicion for being due to her underlying pathology +/- medication related and will f/u with her neurologist. Signed off care to Dr. JAZMIN Malave who will f/u CT.

## 2020-03-12 RX ORDER — TIZANIDINE 4 MG/1
1 TABLET ORAL
Qty: 42 | Refills: 0
Start: 2020-03-12 | End: 2020-03-25

## 2021-01-19 ENCOUNTER — TRANSCRIPTION ENCOUNTER (OUTPATIENT)
Age: 31
End: 2021-01-19

## 2021-02-12 ENCOUNTER — TRANSCRIPTION ENCOUNTER (OUTPATIENT)
Age: 31
End: 2021-02-12

## 2022-08-25 ENCOUNTER — APPOINTMENT (OUTPATIENT)
Dept: CARDIOLOGY | Facility: CLINIC | Age: 32
End: 2022-08-25

## 2022-08-25 VITALS — SYSTOLIC BLOOD PRESSURE: 100 MMHG | WEIGHT: 105 LBS | DIASTOLIC BLOOD PRESSURE: 65 MMHG | HEART RATE: 55 BPM

## 2022-08-25 DIAGNOSIS — Z80.0 FAMILY HISTORY OF MALIGNANT NEOPLASM OF DIGESTIVE ORGANS: ICD-10-CM

## 2022-08-25 DIAGNOSIS — Z78.9 OTHER SPECIFIED HEALTH STATUS: ICD-10-CM

## 2022-08-25 DIAGNOSIS — Z86.69 PERSONAL HISTORY OF OTHER DISEASES OF THE NERVOUS SYSTEM AND SENSE ORGANS: ICD-10-CM

## 2022-08-25 PROCEDURE — 99203 OFFICE O/P NEW LOW 30 MIN: CPT | Mod: 25

## 2022-08-25 PROCEDURE — 93000 ELECTROCARDIOGRAM COMPLETE: CPT

## 2022-08-25 RX ORDER — ZINC OXIDE 13 %
CREAM (GRAM) TOPICAL
Refills: 0 | Status: ACTIVE | COMMUNITY
Start: 2022-08-25

## 2022-08-25 NOTE — PHYSICAL EXAM
[Well Developed] : well developed [Well Nourished] : well nourished [No Acute Distress] : no acute distress [Normal Conjunctiva] : normal conjunctiva [Normal Venous Pressure] : normal venous pressure [5th Left ICS - MCL] : palpated at the 5th LICS in the midclavicular line [Normal] : normal [No Precordial Heave] : no precordial heave was noted [Normal Rate] : normal [Rhythm Regular] : regular [Normal S1] : normal S1 [Normal S2] : normal S2 [No Murmur] : no murmurs heard [No Pitting Edema] : no pitting edema present [2+] : left 2+ [Clear Lung Fields] : clear lung fields [Good Air Entry] : good air entry [No Respiratory Distress] : no respiratory distress  [Soft] : abdomen soft [Non Tender] : non-tender [Normal Bowel Sounds] : normal bowel sounds [No Edema] : no edema [No Varicosities] : no varicosities [No Rash] : no rash [Moves all extremities] : moves all extremities [No Focal Deficits] : no focal deficits [Abnormal Gait] : abnormal gait [Cognitive Impairment] : cognitive impairment [de-identified] : Rett syndrome [de-identified] : Alert.

## 2022-08-25 NOTE — HISTORY OF PRESENT ILLNESS
[FreeTextEntry1] : Ms. Woods is a 30yo F with PMHx of Rett Syndrome and prolonged QT without arrhythmia who presents to \[facility]\"" care. Her PMD is Dr. Soria . She previously followed with pediatric cardiologist Dr. Percy Bustillo. Hillarycamila follows with neurologist, next appointment is in September. She is going for dental procedure on 09/08, needed cardiac consult.  No prolonged QT on EKG today.

## 2022-08-25 NOTE — ASSESSMENT
[FreeTextEntry1] : 31 y.o. female with PMHx of Rett syndrome ant prolonged QT in the past/ No QT prolongation on EKG today.\par - will obtain TTE to r/o structural and functional abnormalities\par - will follow up on blood work ( patient will do BW next month)\par - patient can proceed with dental procedure. No antibacterial medications are required unless indicated by dentist. \par \par F/U in 6 months\par

## 2022-08-25 NOTE — REASON FOR VISIT
[FreeTextEntry1] : Diagnostic Tests:\par --------------------\par EKG:\par 08/25/22-Sinus bradycardia at 55 bpm. QTc 399. \par 03/11/20-Sinus bradycardia at 52 bpm. Possible anterior infarct. \par 07/14/15-NSR. QTc 462. \par --------------------

## 2022-10-18 ENCOUNTER — APPOINTMENT (OUTPATIENT)
Dept: CARDIOLOGY | Facility: CLINIC | Age: 32
End: 2022-10-18

## 2022-11-02 ENCOUNTER — APPOINTMENT (OUTPATIENT)
Dept: CARDIOLOGY | Facility: CLINIC | Age: 32
End: 2022-11-02

## 2023-02-16 ENCOUNTER — APPOINTMENT (OUTPATIENT)
Dept: CARDIOLOGY | Facility: CLINIC | Age: 33
End: 2023-02-16

## 2023-02-16 DIAGNOSIS — I45.81 LONG QT SYNDROME: ICD-10-CM

## 2023-02-16 DIAGNOSIS — F84.2 RETT'S SYNDROME: ICD-10-CM

## 2023-02-16 NOTE — PHYSICAL EXAM
[Well Developed] : well developed [Well Nourished] : well nourished [No Acute Distress] : no acute distress [Normal Conjunctiva] : normal conjunctiva [Normal Venous Pressure] : normal venous pressure [5th Left ICS - MCL] : palpated at the 5th LICS in the midclavicular line [Normal] : normal [No Precordial Heave] : no precordial heave was noted [Normal Rate] : normal [Rhythm Regular] : regular [Normal S1] : normal S1 [Normal S2] : normal S2 [No Murmur] : no murmurs heard [No Pitting Edema] : no pitting edema present [2+] : left 2+ [Clear Lung Fields] : clear lung fields [Good Air Entry] : good air entry [No Respiratory Distress] : no respiratory distress  [Soft] : abdomen soft [Non Tender] : non-tender [Normal Bowel Sounds] : normal bowel sounds [Abnormal Gait] : abnormal gait [No Edema] : no edema [No Varicosities] : no varicosities [No Rash] : no rash [Moves all extremities] : moves all extremities [No Focal Deficits] : no focal deficits [Cognitive Impairment] : cognitive impairment [de-identified] : Rett syndrome [de-identified] : Alert.

## 2023-02-16 NOTE — HISTORY OF PRESENT ILLNESS
[FreeTextEntry1] : Ms. Woods is a 30yo F with PMHx of Rett Syndrome and prolonged QT without arrhythmia who presents to [facility] care. Her PMD is Dr. Soria . She previously followed with pediatric cardiologist Dr. Percy Bustillo. Hillarycamila follows with neurologist, next appointment is in September. She is going for dental procedure on 09/08, needed cardiac consult.  No prolonged QT on EKG today.

## 2023-04-04 NOTE — ED ADULT TRIAGE NOTE - CCCP TRG CHIEF CMPLNT
Pt tolerated Venofer today without incident    Declined AVS but confirmed upcoming appts
lacerations
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
## 2025-04-16 ENCOUNTER — NON-APPOINTMENT (OUTPATIENT)
Age: 35
End: 2025-04-16

## 2025-04-16 ENCOUNTER — APPOINTMENT (OUTPATIENT)
Dept: CARDIOLOGY | Facility: CLINIC | Age: 35
End: 2025-04-16
Payer: MEDICARE

## 2025-04-16 VITALS
HEART RATE: 51 BPM | BODY MASS INDEX: 22.65 KG/M2 | WEIGHT: 105 LBS | DIASTOLIC BLOOD PRESSURE: 73 MMHG | SYSTOLIC BLOOD PRESSURE: 111 MMHG | HEIGHT: 57 IN

## 2025-04-16 DIAGNOSIS — I45.81 LONG QT SYNDROME: ICD-10-CM

## 2025-04-16 DIAGNOSIS — Z15.1 RETT'S SYNDROME: ICD-10-CM

## 2025-04-16 DIAGNOSIS — F84.2 RETT'S SYNDROME: ICD-10-CM

## 2025-04-16 PROCEDURE — 93000 ELECTROCARDIOGRAM COMPLETE: CPT

## 2025-04-16 PROCEDURE — 99204 OFFICE O/P NEW MOD 45 MIN: CPT

## 2025-05-02 NOTE — ED PROVIDER NOTE - PR
In an effort to ensure that our patients LiveWell, a Team Member has reviewed your chart and identified an opportunity to provide the best care possible. An attempt was made to discuss or schedule due or overdue Preventive or Chronic Condition care.Care Gaps identified: Wellness Visits.    The Outcome was Contact was made, appointment declined.   Type of Appointment needed: Primary Care Visit     146

## 2025-06-23 ENCOUNTER — APPOINTMENT (OUTPATIENT)
Dept: CARDIOLOGY | Facility: CLINIC | Age: 35
End: 2025-06-23

## 2025-09-17 ENCOUNTER — NON-APPOINTMENT (OUTPATIENT)
Age: 35
End: 2025-09-17